# Patient Record
Sex: FEMALE | Race: OTHER | HISPANIC OR LATINO
[De-identification: names, ages, dates, MRNs, and addresses within clinical notes are randomized per-mention and may not be internally consistent; named-entity substitution may affect disease eponyms.]

---

## 2023-10-18 PROBLEM — Z00.00 ENCOUNTER FOR PREVENTIVE HEALTH EXAMINATION: Status: ACTIVE | Noted: 2023-10-18

## 2023-10-31 ENCOUNTER — TRANSCRIPTION ENCOUNTER (OUTPATIENT)
Age: 53
End: 2023-10-31

## 2023-10-31 ENCOUNTER — NON-APPOINTMENT (OUTPATIENT)
Age: 53
End: 2023-10-31

## 2023-10-31 ENCOUNTER — APPOINTMENT (OUTPATIENT)
Dept: NEUROSURGERY | Facility: CLINIC | Age: 53
End: 2023-10-31
Payer: COMMERCIAL

## 2023-10-31 VITALS
DIASTOLIC BLOOD PRESSURE: 85 MMHG | SYSTOLIC BLOOD PRESSURE: 128 MMHG | HEIGHT: 64 IN | HEART RATE: 60 BPM | WEIGHT: 139 LBS | BODY MASS INDEX: 23.73 KG/M2

## 2023-10-31 DIAGNOSIS — D18.09 HEMANGIOMA OF OTHER SITES: ICD-10-CM

## 2023-10-31 PROCEDURE — 99205 OFFICE O/P NEW HI 60 MIN: CPT

## 2023-11-01 PROBLEM — D18.09 HEMANGIOMA OF SPINE: Status: ACTIVE | Noted: 2023-11-01

## 2023-11-17 ENCOUNTER — APPOINTMENT (OUTPATIENT)
Dept: PAIN MANAGEMENT | Facility: CLINIC | Age: 53
End: 2023-11-17
Payer: COMMERCIAL

## 2023-11-17 VITALS
HEIGHT: 64 IN | SYSTOLIC BLOOD PRESSURE: 118 MMHG | WEIGHT: 139 LBS | DIASTOLIC BLOOD PRESSURE: 76 MMHG | BODY MASS INDEX: 23.73 KG/M2

## 2023-11-17 DIAGNOSIS — Z78.9 OTHER SPECIFIED HEALTH STATUS: ICD-10-CM

## 2023-11-17 PROCEDURE — 99204 OFFICE O/P NEW MOD 45 MIN: CPT

## 2023-11-20 ENCOUNTER — RESULT REVIEW (OUTPATIENT)
Age: 53
End: 2023-11-20

## 2023-12-08 ENCOUNTER — TRANSCRIPTION ENCOUNTER (OUTPATIENT)
Age: 53
End: 2023-12-08

## 2023-12-08 ENCOUNTER — APPOINTMENT (OUTPATIENT)
Dept: PAIN MANAGEMENT | Facility: HOSPITAL | Age: 53
End: 2023-12-08

## 2023-12-12 ENCOUNTER — APPOINTMENT (OUTPATIENT)
Dept: NEUROSURGERY | Facility: CLINIC | Age: 53
End: 2023-12-12
Payer: COMMERCIAL

## 2023-12-12 VITALS
HEART RATE: 65 BPM | SYSTOLIC BLOOD PRESSURE: 123 MMHG | BODY MASS INDEX: 23.73 KG/M2 | DIASTOLIC BLOOD PRESSURE: 86 MMHG | WEIGHT: 139 LBS | HEIGHT: 64 IN | OXYGEN SATURATION: 99 %

## 2023-12-12 DIAGNOSIS — M50.10 CERVICAL DISC DISORDER WITH RADICULOPATHY, UNSPECIFIED CERVICAL REGION: ICD-10-CM

## 2023-12-12 PROCEDURE — 99215 OFFICE O/P EST HI 40 MIN: CPT

## 2023-12-22 ENCOUNTER — APPOINTMENT (OUTPATIENT)
Dept: PAIN MANAGEMENT | Facility: CLINIC | Age: 53
End: 2023-12-22
Payer: COMMERCIAL

## 2023-12-22 VITALS
WEIGHT: 139 LBS | HEIGHT: 64 IN | DIASTOLIC BLOOD PRESSURE: 88 MMHG | HEART RATE: 73 BPM | BODY MASS INDEX: 23.73 KG/M2 | SYSTOLIC BLOOD PRESSURE: 135 MMHG

## 2023-12-22 PROCEDURE — 99214 OFFICE O/P EST MOD 30 MIN: CPT

## 2023-12-22 RX ORDER — GABAPENTIN 300 MG/1
300 CAPSULE ORAL
Qty: 90 | Refills: 1 | Status: ACTIVE | COMMUNITY
Start: 2023-11-17 | End: 1900-01-01

## 2023-12-22 NOTE — REASON FOR VISIT
[Initial Consultation] : an initial pain management consultation [FreeTextEntry2] : Referred by Dr. Golden

## 2023-12-22 NOTE — PHYSICAL EXAM
[Normal muscle bulk without asymmetry] : normal muscle bulk without asymmetry [Facet Tenderness] : facet tenderness [Normal] : Gait: normal [] : Motor:

## 2023-12-22 NOTE — HISTORY OF PRESENT ILLNESS
[Neck Pain] : neck pain [___ yrs] : [unfilled] year(s) ago [Constant] : constant [8] : a minimum pain level of 8/10 [10] : a maximum pain level of 10/10 [Aching] : aching [Electric] : electric [Turning Head] : turning head [Looking Up] : looking up [Looking Down] : looking down [Medications] : medications [Other: ___] : [unfilled] [FreeTextEntry1] : Interval Note: sp C7-T1 interlaminar epidural steroid injection 12/8/23 without significant improvement in neck pain.  Patient continues to complain of bilateral neck pain r>l with cervical rotation.  Also complains of tingling and arms but improved with gabapentin.  Since last visit the pain is improved. Denies any additional weakness, numbness, bowel/bladder dysfunction.    HPI     Ms. CECILIA GREGORIO is a 53 year F with pmhx of thoracic schwannoma T11-T12 removed (2016-Dr. Wood), migraine headaches, plantar fascitis. C/o worsening occipital/ neck pain and numbness/tingling/cramping to bilateral arms. Worst when laying. Physical therapy without any relief in symptoms. Denies any additional weakness, numbness, bowel/bladder dysfunction, history of bleeding disorders.   Previous and current pain medications/doses/effects: Advil, Tylenol      Previous Pain Treatments: Physical Therapy     Previous Pain Injections:   C7-T1 interlaminar epidural steroid injection 12/8/23     Previous Diagnostic Studies/Images: 10/10/23  Exam: MRI of the Cervical Spine With and Without Contrast  HISTORY: Benign neoplasm of spinal cord.  CONTRAST: 13 mL of Prolance given intravenously without complication.  FINDINGS. here is some very slight retrolisthesis of C4 on C5 by approximately 3  mm. There are no signs of any fractures. There is a hemangioma identified within C6 vertebral body measuring 1.6 cm and that is an incidental finding.  There are no signs of any intramedullary lesions noted. There are no signs of any fractures identified. There are some prominent Modic- type 2 endplate changes identified at the C4-5 level. There does appear to be some enhancement identified at the level of those Modic endplate changes and some trace enhancement within the disc. I would recommended neurology or neurosurgical consultation to ensure that this is all arthritic in origin and not related to a very early or mild discitis.  The C2-3 disc space level is within normal limits.  The C3-4 disc space level is within normal limits  The C4-5 disc space level demonstrates arthritis with prominent Modic-type 1 endplate changes with some disc bulging and some trace enhancement of that disc.  Neurosurgical or neurology consultation recommended to ensure that the abnormalities at this level are related to arthritic changes rather than a very early or mild discitis. No epidural disease process identified at that level.  The C5-6 level is within normal limits.  The C6-7 level is within normal limits.  IMPRESSION:  Some disc space enhancement identified which is very subtle at the C4- 5 level with Modic-type 1 endplate changes and some enhancement to those endplate changes with broad-based bulging at that level narrowing the AP dimension of the spinal canal to 6.6 mm. Neurosurgical consultation recommended to ensure that is not related to a discitis.   ======================================================================================   10/10/23  MRI of the Thoracic Spine With and Without Contrast  HISTORY: Benign neoplasm of spinal cord.  TECHNIQUE: MRI of the thoracic spine without and followed by with 13 mL of  ProHance contrast.  FINDINGS: There is normal alignment and position of the bones. There are no signs of any fractures identified. No evidence for any osseous lesions noted.  The disc spaces are within normal limits without any evidence of any disc hemiations  Surrounding soft tissues appear normal.  No signs of any intramedullary or intrathecal lesions noted.  IMPRESSION:  No significant pathology identified in the thoracic spine.     [de-identified] : tingling

## 2023-12-22 NOTE — ASSESSMENT
[FreeTextEntry1] : >> Imaging and Other Studies  I personally reviewed the relevant imaging.  Discussed and explained to patient the likely source of pathology and pain.  Questions answered. MRI   - reviewed Dr. Barron's note regarding C4-5 arthritic changes, unlikely discitis  >> Therapy and Other Modalities  continue PT  >> Medications  gabapentin 300mg TID   >> Interventions   MRI demonstrating disc herniation causing radiculopathy, significantly impactful to ability to perform ADL and refractory to conservative treatments, including physician directed exercises/PT ().  sp C7-T1 interlaminar epidural steroid injection without significant improvement   significant component of cervical pain likely secondary to spondylosis  demonstrated on MRI CS, refractory to conservative treatments, will schedule diagnostic RIGHT C3, 4, 5 medial branch block r/b/a discussed  >> Consults  start PT  continue care with NS  >> Discussion of Risks/Benefits/Alternatives  	>Regarding any scheduled procedures:  I have discussed in detail with the patient that any interventional pain procedure is associated with potential risks.  The procedure may include an injection of steroids and potentially other medications (local anesthetic and normal saline) into the epidural space or surrounding tissue of the spine.  There are significant risks of this procedure which include and are not limited to infection, bleeding, worsening pain, dural puncture leading to postdural puncture headache, nerve damage, spinal cord injury, paralysis, stroke, and death.    There is a chance that the procedure does not improve their pain.    There are risks associated with the steroid being absorbed into the body systemically.  These include dysphoria, difficulty sleeping, mood swings and personality changes.  Premenopausal women may notice an irregularity in her menstrual cycle for 2-3 months following the injection.  Steroids can specifically affect patients with hypertension, diabetes, and peptic ulcers.  The procedure may cause a temporary increase in blood pressure and blood pressure, and may adversely affect a peptic ulcer.  Other, more rare complications, include avascular necrosis of joints, glaucoma and worsening of osteoporosis.   I have discussed the risks of the procedure at length with the patient, and the potential benefits of pain relief.  I have offered alternatives to the procedure.  All questions were answered.    The patient expressed understanding and wishes to proceed with the procedure.  	>Regarding COVID19 Pandemic:   Any planned interventional pain procedure are scheduled because further delay may cause harm or negative outcome to patient.  The goal in performing this procedure is to avoid deterioration of function, emergency room visits (which increases exposure) and reliance on opioids.    r/b/a discussed with patient, lack of evidence to conclusively determine whether pain management procedures have any positive or negative impact on the possibility of valerie the virus and/or development of any sequelae.   Patient counselled regarding timing steroid based intervention 2 weeks before or after COVID-19 vaccine administration to avoid any interaction or affect on efficacy of vaccination  Patient demonstrates understanding  Informed patient that risks associated with the COVID-19 infection.  Informed patient steps taken to limit the risks.  We are implementing safety precautions and following protocols consistent with the CDC and state recommendations. All patients and staff will be checked for fever or signs of illness upon entry to the facility. We will limit our steroid dose to the lowest effective therapeutic dose or in some cases steroids will not be injected at all.   Patient agrees to proceed  >> Conclusion  The above diagnosis and treatment plan is medically reasonable and necessary based on the patient encounter  There were no barriers to communication. Informed patient that I would be available for any additional questions. Patient was instructed to call with any worsening symptoms including severe pain, new numbness/weakness, or changes in the bowel/bladder function.  Discussed role of nsaids in pain management and all relevant risks, if patient is continuing to require after 4 weeks the patient should f/u for alternative treatment.  Instructed patient to maintain pain diary to monitor pain level, mobility, and function.  The referring provider was informed of the above diagnosis and treatment plan.

## 2024-01-17 ENCOUNTER — APPOINTMENT (OUTPATIENT)
Dept: PAIN MANAGEMENT | Facility: HOSPITAL | Age: 54
End: 2024-01-17

## 2024-01-17 ENCOUNTER — TRANSCRIPTION ENCOUNTER (OUTPATIENT)
Age: 54
End: 2024-01-17

## 2024-01-18 ENCOUNTER — APPOINTMENT (OUTPATIENT)
Dept: PAIN MANAGEMENT | Facility: CLINIC | Age: 54
End: 2024-01-18
Payer: COMMERCIAL

## 2024-01-18 PROCEDURE — 99212 OFFICE O/P EST SF 10 MIN: CPT | Mod: 95

## 2024-01-18 NOTE — ASSESSMENT
[FreeTextEntry1] : >> Imaging and Other Studies  I personally reviewed the relevant imaging.  Discussed and explained to patient the likely source of pathology and pain.  Questions answered. MRI   - reviewed Dr. Barron's note regarding C4-5 arthritic changes, unlikely discitis  >> Therapy and Other Modalities  continue PT  >> Medications  gabapentin 300mg TID   >> Interventions   MRI demonstrating disc herniation causing radiculopathy, significantly impactful to ability to perform ADL and refractory to conservative treatments, including physician directed exercises/PT ().  sp C7-T1 interlaminar epidural steroid injection without significant improvement   significant component of cervical pain likely secondary to spondylosis  demonstrated on MRI CS, refractory to conservative treatments, sp diagnostic RIGHT C3, 4, 5 medial branch block with sustained 100% improvement  >> Consults  start PT  continue care with NS  >> Discussion of Risks/Benefits/Alternatives  	>Regarding any scheduled procedures:  I have discussed in detail with the patient that any interventional pain procedure is associated with potential risks.  The procedure may include an injection of steroids and potentially other medications (local anesthetic and normal saline) into the epidural space or surrounding tissue of the spine.  There are significant risks of this procedure which include and are not limited to infection, bleeding, worsening pain, dural puncture leading to postdural puncture headache, nerve damage, spinal cord injury, paralysis, stroke, and death.    There is a chance that the procedure does not improve their pain.    There are risks associated with the steroid being absorbed into the body systemically.  These include dysphoria, difficulty sleeping, mood swings and personality changes.  Premenopausal women may notice an irregularity in her menstrual cycle for 2-3 months following the injection.  Steroids can specifically affect patients with hypertension, diabetes, and peptic ulcers.  The procedure may cause a temporary increase in blood pressure and blood pressure, and may adversely affect a peptic ulcer.  Other, more rare complications, include avascular necrosis of joints, glaucoma and worsening of osteoporosis.   I have discussed the risks of the procedure at length with the patient, and the potential benefits of pain relief.  I have offered alternatives to the procedure.  All questions were answered.    The patient expressed understanding and wishes to proceed with the procedure.   > Longitudinal management of Complex Painful condition   The patient is being managed for a complex condition that requires ongoing management.  The nature of this condition demands nuanced approach to treatment.  The seriousness of the condition necessitates an in-depth and focused approach to management and coordination with other healthcare professionals.    This visit involves intricate evaluation and management of the patient's condition.  The complexity of the visit was due to the need for detailed assessment of the current state, consideration of potential complications and a careful balancing of treatment options to management the chronic condition effectively.   As detailed above, the patient has a chronic significant painful condition that requires regular and detailed management.  The condition's impact on the patient's quality of life and health is substantial and necessitates a comprehensive and tailored approach   >> Conclusion   There were no barriers to communication. Informed patient that I would be available for any additional questions. Patient was instructed to call with any worsening symptoms including severe pain, new numbness/weakness, or changes in the bowel/bladder function. Discussed role of nsaids in pain management and all relevant risks, if patient is continuing to require after 4 weeks the patient should f/u for alternative treatment. Instructed patient to maintain pain diary to monitor pain level, mobility, and function.  I explained to patient benefits and limitation of TeleMedicine visits  Patient understands that limitations include inability to perform comprehensive physical exam, which may lead to potential diagnostic inconsistencies.    Any scheduled procedures are based on history, imaging and limited physical exam performed on TeleHealth visit.  If necessary, additional focal physical exam will be performed on date of procedure  Patient understands that diagnosis and treatment may be limited by these inconsistencies and patient agrees to proceed with care plan

## 2024-01-18 NOTE — HISTORY OF PRESENT ILLNESS
[Home] : at home, [unfilled] , at the time of the visit. [Medical Office: ([facility])___] : at the medical office located in  [Verbal consent obtained from patient] : the patient, [unfilled] [Neck Pain] : neck pain [___ yrs] : [unfilled] year(s) ago [Constant] : constant [8] : a minimum pain level of 8/10 [10] : a maximum pain level of 10/10 [Aching] : aching [Electric] : electric [Turning Head] : turning head [Looking Up] : looking up [Looking Down] : looking down [Medications] : medications [Other: ___] : [unfilled] [FreeTextEntry1] : Interval Note:  sp right C3, 4, 5 medial branch block 1/17/23 with 100% improvement sustained of right neck pain.   Since last visit the pain is improved. Denies any additional weakness, numbness, bowel/bladder dysfunction.    HPI     Ms. CECILIA GREGORIO is a 53 year F with pmhx of thoracic schwannoma T11-T12 removed (2016-Dr. Wood), migraine headaches, plantar fascitis. C/o worsening occipital/ neck pain and numbness/tingling/cramping to bilateral arms. Worst when laying. Physical therapy without any relief in symptoms. Denies any additional weakness, numbness, bowel/bladder dysfunction, history of bleeding disorders.   Previous and current pain medications/doses/effects: Advil, Tylenol      Previous Pain Treatments: Physical Therapy     Previous Pain Injections:  right C3, 4, 5 medial branch block 1/17/23  C7-T1 interlaminar epidural steroid injection 12/8/23     Previous Diagnostic Studies/Images: 10/10/23  Exam: MRI of the Cervical Spine With and Without Contrast  HISTORY: Benign neoplasm of spinal cord.  CONTRAST: 13 mL of Prolance given intravenously without complication.  FINDINGS. here is some very slight retrolisthesis of C4 on C5 by approximately 3  mm. There are no signs of any fractures. There is a hemangioma identified within C6 vertebral body measuring 1.6 cm and that is an incidental finding.  There are no signs of any intramedullary lesions noted. There are no signs of any fractures identified. There are some prominent Modic- type 2 endplate changes identified at the C4-5 level. There does appear to be some enhancement identified at the level of those Modic endplate changes and some trace enhancement within the disc. I would recommended neurology or neurosurgical consultation to ensure that this is all arthritic in origin and not related to a very early or mild discitis.  The C2-3 disc space level is within normal limits.  The C3-4 disc space level is within normal limits  The C4-5 disc space level demonstrates arthritis with prominent Modic-type 1 endplate changes with some disc bulging and some trace enhancement of that disc.  Neurosurgical or neurology consultation recommended to ensure that the abnormalities at this level are related to arthritic changes rather than a very early or mild discitis. No epidural disease process identified at that level.  The C5-6 level is within normal limits.  The C6-7 level is within normal limits.  IMPRESSION:  Some disc space enhancement identified which is very subtle at the C4- 5 level with Modic-type 1 endplate changes and some enhancement to those endplate changes with broad-based bulging at that level narrowing the AP dimension of the spinal canal to 6.6 mm. Neurosurgical consultation recommended to ensure that is not related to a discitis.   ======================================================================================   10/10/23  MRI of the Thoracic Spine With and Without Contrast  HISTORY: Benign neoplasm of spinal cord.  TECHNIQUE: MRI of the thoracic spine without and followed by with 13 mL of  ProHance contrast.  FINDINGS: There is normal alignment and position of the bones. There are no signs of any fractures identified. No evidence for any osseous lesions noted.  The disc spaces are within normal limits without any evidence of any disc hemiations  Surrounding soft tissues appear normal.  No signs of any intramedullary or intrathecal lesions noted.  IMPRESSION:  No significant pathology identified in the thoracic spine.     [de-identified] : tingling

## 2024-01-18 NOTE — PHYSICAL EXAM
[de-identified] :  Constitutional: Normal, well developed, no acute distress on audio/video examination Eyes: Symmetric, External structures on video examination ENT: Lips, mucosa and tongue normal on video examination Oropharynx: Lips normal, symmetric, no external lesions appreciated appreciated on video examination Respiratory: Non-labored breathing, no audible wheezes appreciated on audio/video examination Vascular: No cyanosis appreciated or edema appreciated on video examination GI:  no jaundice appreciated on video examination Neurovascular: CN grossly intact on video/audio examination, alert MSK: Normal muscle bulk on video examination

## 2024-01-30 ENCOUNTER — APPOINTMENT (OUTPATIENT)
Dept: NEUROSURGERY | Facility: CLINIC | Age: 54
End: 2024-01-30

## 2024-02-02 ENCOUNTER — APPOINTMENT (OUTPATIENT)
Dept: PAIN MANAGEMENT | Facility: CLINIC | Age: 54
End: 2024-02-02
Payer: COMMERCIAL

## 2024-02-02 VITALS
HEIGHT: 64 IN | DIASTOLIC BLOOD PRESSURE: 94 MMHG | WEIGHT: 139 LBS | BODY MASS INDEX: 23.73 KG/M2 | SYSTOLIC BLOOD PRESSURE: 147 MMHG

## 2024-02-02 DIAGNOSIS — M47.812 SPONDYLOSIS W/OUT MYELOPATHY OR RADICULOPATHY, CERVICAL REGION: ICD-10-CM

## 2024-02-02 DIAGNOSIS — M79.2 NEURALGIA AND NEURITIS, UNSPECIFIED: ICD-10-CM

## 2024-02-02 DIAGNOSIS — G89.4 CHRONIC PAIN SYNDROME: ICD-10-CM

## 2024-02-02 DIAGNOSIS — M79.18 MYALGIA, OTHER SITE: ICD-10-CM

## 2024-02-02 PROCEDURE — 99214 OFFICE O/P EST MOD 30 MIN: CPT

## 2024-02-02 PROCEDURE — G2211 COMPLEX E/M VISIT ADD ON: CPT

## 2024-02-02 NOTE — ASSESSMENT
[FreeTextEntry1] : >> Imaging and Other Studies  I personally reviewed the relevant imaging.  Discussed and explained to patient the likely source of pathology and pain.  Questions answered. MRI   - reviewed Dr. Barron's note regarding C4-5 arthritic changes, unlikely discitis  >> Therapy and Other Modalities  continue PT  >> Medications  gabapentin 300mg TID   >> Interventions   MRI demonstrating disc herniation causing radiculopathy, significantly impactful to ability to perform ADL and refractory to conservative treatments, including physician directed exercises/PT ().  sp C7-T1 interlaminar epidural steroid injection without significant improvement   significant component of cervical pain likely secondary to spondylosis  demonstrated on MRI CS, refractory to conservative treatments, sp diagnostic RIGHT C3, 4, 5 medial branch block with sustained 100% improvement  >> Consults  start PT  continue care with NS  >> Discussion of Risks/Benefits/Alternatives  	>Regarding any scheduled procedures:  I have discussed in detail with the patient that any interventional pain procedure is associated with potential risks.  The procedure may include an injection of steroids and potentially other medications (local anesthetic and normal saline) into the epidural space or surrounding tissue of the spine.  There are significant risks of this procedure which include and are not limited to infection, bleeding, worsening pain, dural puncture leading to postdural puncture headache, nerve damage, spinal cord injury, paralysis, stroke, and death.    There is a chance that the procedure does not improve their pain.    There are risks associated with the steroid being absorbed into the body systemically.  These include dysphoria, difficulty sleeping, mood swings and personality changes.  Premenopausal women may notice an irregularity in her menstrual cycle for 2-3 months following the injection.  Steroids can specifically affect patients with hypertension, diabetes, and peptic ulcers.  The procedure may cause a temporary increase in blood pressure and blood pressure, and may adversely affect a peptic ulcer.  Other, more rare complications, include avascular necrosis of joints, glaucoma and worsening of osteoporosis.   I have discussed the risks of the procedure at length with the patient, and the potential benefits of pain relief.  I have offered alternatives to the procedure.  All questions were answered.    The patient expressed understanding and wishes to proceed with the procedure.   > Longitudinal management of Complex Painful condition   The patient is being managed for a complex condition that requires ongoing management.  The nature of this condition demands nuanced approach to treatment.  The seriousness of the condition necessitates an in-depth and focused approach to management and coordination with other healthcare professionals.    This visit involves intricate evaluation and management of the patient's condition.  The complexity of the visit was due to the need for detailed assessment of the current state, consideration of potential complications and a careful balancing of treatment options to management the chronic condition effectively.   As detailed above, the patient has a chronic significant painful condition that requires regular and detailed management.  The condition's impact on the patient's quality of life and health is substantial and necessitates a comprehensive and tailored approach   >> Conclusion   There were no barriers to communication. Informed patient that I would be available for any additional questions. Patient was instructed to call with any worsening symptoms including severe pain, new numbness/weakness, or changes in the bowel/bladder function. Discussed role of nsaids in pain management and all relevant risks, if patient is continuing to require after 4 weeks the patient should f/u for alternative treatment. Instructed patient to maintain pain diary to monitor pain level, mobility, and function.  I

## 2024-02-02 NOTE — PHYSICAL EXAM
[Normal muscle bulk without asymmetry] : normal muscle bulk without asymmetry [Facet Tenderness] : no facet tenderness [Normal] : Gait: normal [Spurling] : negative Spurling's Test [] : Motor:

## 2024-02-02 NOTE — HISTORY OF PRESENT ILLNESS
[Neck Pain] : neck pain [___ yrs] : [unfilled] year(s) ago [Constant] : constant [8] : a minimum pain level of 8/10 [10] : a maximum pain level of 10/10 [Aching] : aching [Electric] : electric [Turning Head] : turning head [Looking Up] : looking up [Looking Down] : looking down [Medications] : medications [Other: ___] : [unfilled] [FreeTextEntry1] : Interval Note:  sp right C3, 4, 5 medial branch block 1/17/23 with 100% improvement sustained of right neck pain. Patient reports that she is doing exceptionally well.  Reports that she is able to work and perform adls.  Continues gabapentin without medication adverse effects.   Since last visit the pain is improved. Denies any additional weakness, numbness, bowel/bladder dysfunction.    HPI     Ms. CECILIA GREGORIO is a 53 year F with pmhx of thoracic schwannoma T11-T12 removed (2016-Dr. Wood), migraine headaches, plantar fascitis. C/o worsening occipital/ neck pain and numbness/tingling/cramping to bilateral arms. Worst when laying. Physical therapy without any relief in symptoms. Denies any additional weakness, numbness, bowel/bladder dysfunction, history of bleeding disorders.   Previous and current pain medications/doses/effects: Advil, Tylenol      Previous Pain Treatments: Physical Therapy     Previous Pain Injections:  right C3, 4, 5 medial branch block 1/17/23  C7-T1 interlaminar epidural steroid injection 12/8/23     Previous Diagnostic Studies/Images: 10/10/23  Exam: MRI of the Cervical Spine With and Without Contrast  HISTORY: Benign neoplasm of spinal cord.  CONTRAST: 13 mL of Prolance given intravenously without complication.  FINDINGS. here is some very slight retrolisthesis of C4 on C5 by approximately 3  mm. There are no signs of any fractures. There is a hemangioma identified within C6 vertebral body measuring 1.6 cm and that is an incidental finding.  There are no signs of any intramedullary lesions noted. There are no signs of any fractures identified. There are some prominent Modic- type 2 endplate changes identified at the C4-5 level. There does appear to be some enhancement identified at the level of those Modic endplate changes and some trace enhancement within the disc. I would recommended neurology or neurosurgical consultation to ensure that this is all arthritic in origin and not related to a very early or mild discitis.  The C2-3 disc space level is within normal limits.  The C3-4 disc space level is within normal limits  The C4-5 disc space level demonstrates arthritis with prominent Modic-type 1 endplate changes with some disc bulging and some trace enhancement of that disc.  Neurosurgical or neurology consultation recommended to ensure that the abnormalities at this level are related to arthritic changes rather than a very early or mild discitis. No epidural disease process identified at that level.  The C5-6 level is within normal limits.  The C6-7 level is within normal limits.  IMPRESSION:  Some disc space enhancement identified which is very subtle at the C4- 5 level with Modic-type 1 endplate changes and some enhancement to those endplate changes with broad-based bulging at that level narrowing the AP dimension of the spinal canal to 6.6 mm. Neurosurgical consultation recommended to ensure that is not related to a discitis.   ======================================================================================   10/10/23  MRI of the Thoracic Spine With and Without Contrast  HISTORY: Benign neoplasm of spinal cord.  TECHNIQUE: MRI of the thoracic spine without and followed by with 13 mL of  ProHance contrast.  FINDINGS: There is normal alignment and position of the bones. There are no signs of any fractures identified. No evidence for any osseous lesions noted.  The disc spaces are within normal limits without any evidence of any disc hemiations  Surrounding soft tissues appear normal.  No signs of any intramedullary or intrathecal lesions noted.  IMPRESSION:  No significant pathology identified in the thoracic spine.     [de-identified] : tingling

## 2024-03-01 ENCOUNTER — APPOINTMENT (OUTPATIENT)
Dept: PAIN MANAGEMENT | Facility: CLINIC | Age: 54
End: 2024-03-01